# Patient Record
Sex: MALE | Race: WHITE | NOT HISPANIC OR LATINO | Employment: OTHER | URBAN - METROPOLITAN AREA
[De-identification: names, ages, dates, MRNs, and addresses within clinical notes are randomized per-mention and may not be internally consistent; named-entity substitution may affect disease eponyms.]

---

## 2017-01-01 ENCOUNTER — HOSPITAL ENCOUNTER (EMERGENCY)
Facility: HOSPITAL | Age: 57
End: 2017-03-30
Attending: EMERGENCY MEDICINE

## 2017-01-01 VITALS — WEIGHT: 170 LBS | HEIGHT: 68 IN | BODY MASS INDEX: 25.76 KG/M2

## 2017-01-01 DIAGNOSIS — I46.9 CARDIAC ARREST: Primary | ICD-10-CM

## 2017-01-01 PROCEDURE — 63600175 PHARM REV CODE 636 W HCPCS: Performed by: EMERGENCY MEDICINE

## 2017-01-01 PROCEDURE — 99291 CRITICAL CARE FIRST HOUR: CPT | Mod: 25

## 2017-01-01 PROCEDURE — 92950 HEART/LUNG RESUSCITATION CPR: CPT

## 2017-01-01 PROCEDURE — 25000003 PHARM REV CODE 250: Performed by: EMERGENCY MEDICINE

## 2017-01-01 PROCEDURE — 99291 CRITICAL CARE FIRST HOUR: CPT | Mod: ,,, | Performed by: EMERGENCY MEDICINE

## 2017-01-01 PROCEDURE — 99900035 HC TECH TIME PER 15 MIN (STAT)

## 2017-01-01 RX ORDER — EPINEPHRINE 1 MG/ML
INJECTION INTRAMUSCULAR; INTRAVENOUS; SUBCUTANEOUS CODE/TRAUMA/SEDATION MEDICATION
Status: COMPLETED | OUTPATIENT
Start: 2017-01-01 | End: 2017-01-01

## 2017-01-01 RX ORDER — MAGNESIUM SULFATE 500 MG/ML
INJECTION, SOLUTION INTRAMUSCULAR; INTRAVENOUS CODE/TRAUMA/SEDATION MEDICATION
Status: COMPLETED | OUTPATIENT
Start: 2017-01-01 | End: 2017-01-01

## 2017-01-01 RX ORDER — SODIUM CHLORIDE 9 MG/ML
INJECTION, SOLUTION INTRAVENOUS
Status: COMPLETED | OUTPATIENT
Start: 2017-01-01 | End: 2017-01-01

## 2017-01-01 RX ORDER — SODIUM BICARBONATE 1 MEQ/ML
SYRINGE (ML) INTRAVENOUS CODE/TRAUMA/SEDATION MEDICATION
Status: COMPLETED | OUTPATIENT
Start: 2017-01-01 | End: 2017-01-01

## 2017-01-01 RX ADMIN — SODIUM BICARBONATE 50 MEQ: 84 INJECTION, SOLUTION INTRAVENOUS at 08:03

## 2017-01-01 RX ADMIN — EPINEPHRINE 1 MG: 1 INJECTION INTRAMUSCULAR; INTRAVENOUS; SUBCUTANEOUS at 08:03

## 2017-01-01 RX ADMIN — SODIUM CHLORIDE 500 ML/HR: 0.9 INJECTION, SOLUTION INTRAVENOUS at 08:03

## 2017-01-01 RX ADMIN — MAGNESIUM SULFATE 2 G: 500 INJECTION, SOLUTION INTRAMUSCULAR; INTRAVENOUS at 08:03

## 2017-01-01 RX ADMIN — SODIUM BICARBONATE 100 MEQ: 84 INJECTION, SOLUTION INTRAVENOUS at 08:03

## 2017-03-30 NOTE — ED NOTES
Pt arrived to ED intubated with [place] in progress, transferred into ED bed 1 with MD, pharm and nurses at bedside taking over Cardiac Arrest Code

## 2017-03-30 NOTE — ED PROVIDER NOTES
Encounter Date: 3/29/2017    SCRIBE #1 NOTE: I, Zuly Bertrand , am scribing for, and in the presence of,  Dr. Díaz . I have scribed the following portions of the note - the Resident attestation.       History     Chief Complaint   Patient presents with    Cardiac Arrest     pt collapsed at store and bystanders called EMS no CPR started until EMS arrived, EMS worked for 30 min transfering to ED     Review of patient's allergies indicates:  Allergies not on file  HPI Comments: 56-year-old  male presents to the emergency department after he was brought in by EMS.  Per EMS he collapsed at the [employer] store and then EMS was activated.  He had no bystander CPR until EMS arrived and initiated ACLS.  EMS reported about 5 min downtime with no CPR prior to their arrival.  With EMS the patient had initial ventricle fibrillation and was defibrillated 8 times, he had epinephrine 7 times, amiodarone twice and lidocaine twice.  He was worked on scene for about 30 minutes and transferred to this hospital.      History reviewed. No pertinent past medical history.  History reviewed. No pertinent surgical history.  History reviewed. No pertinent family history.  Social History   Substance Use Topics    Smoking status: Never Smoker    Smokeless tobacco: None    Alcohol use None     Review of Systems   Unable to perform ROS: Acuity of condition       Physical Exam   Initial Vitals   BP Pulse Resp Temp SpO2   -- 03/29/17 2032 -- -- --    0        Physical Exam    Constitutional: He is not diaphoretic.   HENT:   Head: Normocephalic and atraumatic.   Right Ear: External ear normal.   Left Ear: External ear normal.   Eyes:   Patient's eyes noted to be fixed and dilated   Neck:   C-collar in place   Cardiovascular:   Asystole   Pulmonary/Chest: No respiratory distress. He has no wheezes. He has no rales.   Patient intubated   Abdominal: Soft. He exhibits no distension.   Neurological:   Patient unresponsive   Skin: Skin is  warm and dry.         ED Course   Procedures   Critical Care Procedure Note:  Direct patient critical care time: 30 minutes  Additional history critical care time:10 minutes  Ordering / reviewing labs and studies: critical care time:10 minutes  Documentation critical care time: 10 minutes  Consulting other physicians critical care time: 10 minutes  Total critical care time (exclusive of procedural time) : 70 minutes   Reason for Critical Care: Cardiac arrest    Labs Reviewed - No data to display                APC / Resident Notes:   [facility]  ED course:   Upon arrival he was noted to be in asystole.  Patient was transferred from the stretcher to the bed.  Tube placement was checked via CO2 monitor and breath sounds.  He was given an amp of bicarbonate, magnesium, and multiple boluses of epinephrine (5), he was noticed to be in asystole, then change to agonal heart rate/PEA, then converted to asystole again.  Patient continued to have no pulse.  No cardiac activity was noted on bedside ultrasound.  At 2037 time of death was called.  Win Rodriguez PGY-2 U EM  03/29/2017 10:54 PM         Scribe Attestation:   Scribe #1: I performed the above scribed service and the documentation accurately describes the services I performed. I attest to the accuracy of the note.    Attending Attestation:   Physician Attestation Statement for Resident:  As the supervising MD   Physician Attestation Statement: I have personally seen and examined this patient.   I agree with the above history. -: Patient presents in cardiac arrest. On arrival pupils dilated at 4mm and unreactived bilaterally. ON arrival ETT confirmed to be in place with + color change on ETCO2 monitor and presence of bilateral breath sounds.  CPR in progress on arrival.  The story we got from EMS was that the patient dropped to the floor in a store. EMS was called. Downtime of at least 5 minutes  before EMS arrived with no chest compressions. On EMS arrival, pt.  was in V-fib. 8 rounds ACLS with 8 shocks were delivered for persistent V-fib. Given epinephrine, amiodarone, lidocaine x 2 in the field. Still in V-fib on last pulse check in the field. On our first pulse check the pt was in asystole and had no palpable pulse. On 2nd, 3rd, and 4th pulse checks after epi, bicarb, mag, the patient was in PEA with severely bradycardic rhythm and no pulse. During these pulse checks his heart was visualized with ultrasound and had agonal squeeze.  On final pulse check pt. was at no pulse and was in asystole. The heart again was visualized and this time was not moving at all. The decision was made to call the code at this time given no ROSC, very likely poor neurologic outcome, and I felt that further efforts would be futile. Time of death: 8:37 PM.  Spoke with family in the family room and spent time comforting them and answering all of their questions.  The  was present to bring them to visit the body of their loved one.   As the supervising MD I agree with the above PE.    As the supervising MD I agree with the above treatment, course, plan, and disposition.  I have reviewed and agree with the residents interpretation of the following: lab data.          Physician Attestation for Scribe:  Physician Attestation Statement for Scribe #1: I, Dr. Díaz , reviewed documentation, as scribed by Zuly Bertrand  in my presence, and it is both accurate and complete.                 ED Course     Clinical Impression:   The encounter diagnosis was Cardiac arrest.          Cisco Díaz MD  03/29/17 [id number]

## 2017-03-30 NOTE — ED NOTES
[facility] office notified of death. Awaiting call back from investigator.

## 2017-03-30 NOTE — SIGNIFICANT EVENT
Patient arrived via EMS, intubated. MD at bedside, code blue resumed. Code blue called time of death 2037.

## 2017-03-30 NOTE — ED TRIAGE NOTES
Pt arrived to ED bed 1 via EMS. EMS states they got a call from [employer] that bystanders witness pt collapse to ground, no CPR was started until EMS arrived approx 5 min later. EMS states pt was in Vfib on arrival and they admin 11x shocks, 3 rounds 150 amiodarone, 7x 1 epi, 2x 100 lidocaine, IO placed to left tibia from EMS, pt intubated on arrival to ED and CPR via [place] in progress. EMS reports they worked pt for 30 minutes prior to transferring pt to ED.

## 2017-03-30 NOTE — ED NOTES
[facility] referral number [id number]. Screen by Rosa Rodriguez. States she will call back in about 45 minutes to see if more information is obtained from family once arrived.

## 2017-03-30 NOTE — ED NOTES
Pt to be 's case. States ok to place pt in facility [facility].

## 2017-04-03 LAB
BUN SERPL-MCNC: 18 MG/DL (ref 6–30)
CHLORIDE SERPL-SCNC: 97 MMOL/L (ref 95–110)
CREAT SERPL-MCNC: 1.4 MG/DL (ref 0.5–1.4)
GLUCOSE SERPL-MCNC: 356 MG/DL (ref 70–110)
HCT VFR BLD CALC: 40 %PCV (ref 36–54)
POC IONIZED CALCIUM: 1.14 MMOL/L (ref 1.06–1.42)
POC TCO2 (MEASURED): 27 MMOL/L (ref 23–29)
POTASSIUM BLD-SCNC: 3.1 MMOL/L (ref 3.5–5.1)
SAMPLE: ABNORMAL
SODIUM BLD-SCNC: 144 MMOL/L (ref 136–145)